# Patient Record
(demographics unavailable — no encounter records)

---

## 2025-06-04 NOTE — ASSESSMENT
[FreeTextEntry1] : Patient is well, no complaints.   incision is healing well, 2 sutures were removed. 3-5 cc serous fluid expressed.    f/u 2 weeks if needed